# Patient Record
Sex: MALE | Race: ASIAN | URBAN - METROPOLITAN AREA
[De-identification: names, ages, dates, MRNs, and addresses within clinical notes are randomized per-mention and may not be internally consistent; named-entity substitution may affect disease eponyms.]

---

## 2020-03-30 ENCOUNTER — EMERGENCY (EMERGENCY)
Facility: HOSPITAL | Age: 25
LOS: 1 days | Discharge: HOME | End: 2020-03-30
Admitting: EMERGENCY MEDICINE
Payer: COMMERCIAL

## 2020-03-30 VITALS
WEIGHT: 132.28 LBS | TEMPERATURE: 97 F | RESPIRATION RATE: 18 BRPM | SYSTOLIC BLOOD PRESSURE: 124 MMHG | DIASTOLIC BLOOD PRESSURE: 83 MMHG | HEIGHT: 68.11 IN | HEART RATE: 90 BPM | OXYGEN SATURATION: 100 %

## 2020-03-30 PROCEDURE — 96372 THER/PROPH/DIAG INJ SC/IM: CPT

## 2020-03-30 PROCEDURE — 99284 EMERGENCY DEPT VISIT MOD MDM: CPT

## 2020-03-30 PROCEDURE — 99283 EMERGENCY DEPT VISIT LOW MDM: CPT | Mod: 25

## 2020-03-30 RX ORDER — TETANUS IMMUNE GLOBULIN 250 UNIT
250 SYRINGE (EA) INTRAMUSCULAR ONCE
Refills: 0 | Status: COMPLETED | OUTPATIENT
Start: 2020-03-30 | End: 2020-03-30

## 2020-03-30 RX ADMIN — Medication 250 UNIT(S): at 09:38

## 2020-03-30 NOTE — ED ADULT TRIAGE NOTE - CHIEF COMPLAINT QUOTE
Presents to ED from Urgent Care for tetanus immune globulin human test after having his own cat scratch him 2 days ago.  Left elbow where scratch is located is clean, dry with no s/s of infection noted.  Patient reports when at Urgent care he did obtain a Tetanus vaccination.  Denies any fevers, chills, SOB, CP, cough.

## 2020-03-30 NOTE — ED PROVIDER NOTE - NSFOLLOWUPINSTRUCTIONS_ED_ALL_ED_FT
You received tetanus immunoglobulin today.   Please continue your augmentin as prescribed.  Please follow up for the last tetanus vaccine in your series as previously scheduled.    Return to the Emergency Department if you develop if you develop fever>100.4F, elbow swelling or pain, drainage from wound site, or any other concerns.

## 2020-03-30 NOTE — ED PROVIDER NOTE - NS ED ROS FT
Constitutional: No fever. No chills.  Eyes: No redness. No discharge. No vision change.   ENT: No sore throat. No ear pain.  MSK: No joint pain. No back pain.   Skin: No rash. +abrasion.  Neuro: No numbness. No weakness.   Psych: No known mental health issues.

## 2020-03-30 NOTE — ED PROVIDER NOTE - PATIENT PORTAL LINK FT
You can access the FollowMyHealth Patient Portal offered by [facility] by registering at the following website: [email or web address]. By joining [facility]’s FollowMyHealth portal, you will also be able to view your health information using other applications (apps) compatible with our system.

## 2020-03-30 NOTE — ED ADULT NURSE NOTE - OBJECTIVE STATEMENT
23 y/o male w/ no PMHx presents to the ED s/p cat scratch on 2/27/2020. Pt bitten by own cat who is UTD on all vaccines and lives inside. Pt was evaluated by telemedicine and prescribed Augmentin. Pt unsure if ever vaccinated for tetanus as a child. Pt received 2nd vaccine of 3 part series a few days ago. Denies fever, chills, pain, redness, swelling, joint pain, and any other associated sx.

## 2020-03-30 NOTE — ED PROVIDER NOTE - PHYSICAL EXAMINATION
VITAL SIGNS: I have reviewed nursing notes and confirm.  CONSTITUTIONAL: Well-developed; well-nourished; in no acute distress.   SKIN:  warm and dry, no acute rash. small abrasion over left elbow without surrounding erythema, swelling or drainage.   HEAD:  normocephalic, atraumatic.  EYES: PERRL, EOM intact; conjunctiva and sclera clear.  ENT: No nasal discharge; airway clear.   NECK: Supple; non tender.  EXT: Normal ROM. No clubbing, cyanosis or edema. 2+ pulses to b/l ue/le.  NEURO: Alert, oriented, grossly unremarkable  PSYCH: Cooperative, mood and affect appropriate.

## 2020-03-30 NOTE — ED PROVIDER NOTE - OBJECTIVE STATEMENT
23yo male with no reported pmhx presents after cat bite on 2/27/2020. Pt states he was bitten by his own cat who is vaccinated and lives inside. He was evaluated via [facility] telemedicine and prescribed augmentin. He is unsure whether he was ever vaccinated for tetanus as a child because he grew up in [facility]. He most recently received the 2nd vaccine in the three part series. He was instructed to come for serum tetanus Ig. He denies fever, chills, pain at site, drainage from wound, joint pain.

## 2020-03-30 NOTE — ED PROVIDER NOTE - CLINICAL SUMMARY MEDICAL DECISION MAKING FREE TEXT BOX
ED course unremarkable - afebrile and hemodynamically stable. No evidence of infection. Will give IM tetanus Ig; however, low suspicion for tetanus as bite occurred 3 days ago and cat lives indoors. Pt instructed to continue augmentin and follow up for last shot in tetanus series when indicated. Return precautions given.

## 2020-03-30 NOTE — ED PROVIDER NOTE - NSDCPRINTRESULTS_ED_ALL_ED
Patient requests all Lab and Radiology Results on their Discharge Instructions no chest pain, no cough, and no shortness of breath.

## 2020-04-03 DIAGNOSIS — Z20.3 CONTACT WITH AND (SUSPECTED) EXPOSURE TO RABIES: ICD-10-CM
